# Patient Record
Sex: MALE | Race: WHITE | NOT HISPANIC OR LATINO | Employment: PART TIME | URBAN - NONMETROPOLITAN AREA
[De-identification: names, ages, dates, MRNs, and addresses within clinical notes are randomized per-mention and may not be internally consistent; named-entity substitution may affect disease eponyms.]

---

## 2022-09-22 ENCOUNTER — OFFICE VISIT (OUTPATIENT)
Dept: INTERNAL MEDICINE | Facility: CLINIC | Age: 62
End: 2022-09-22

## 2022-09-22 VITALS
HEART RATE: 70 BPM | HEIGHT: 70 IN | OXYGEN SATURATION: 98 % | DIASTOLIC BLOOD PRESSURE: 72 MMHG | TEMPERATURE: 97.3 F | BODY MASS INDEX: 34.65 KG/M2 | SYSTOLIC BLOOD PRESSURE: 120 MMHG | WEIGHT: 242 LBS

## 2022-09-22 DIAGNOSIS — Z11.59 ENCOUNTER FOR HEPATITIS C SCREENING TEST FOR LOW RISK PATIENT: ICD-10-CM

## 2022-09-22 DIAGNOSIS — R35.1 NOCTURIA: ICD-10-CM

## 2022-09-22 DIAGNOSIS — N30.01 ACUTE CYSTITIS WITH HEMATURIA: Primary | ICD-10-CM

## 2022-09-22 DIAGNOSIS — R35.0 URINARY FREQUENCY: ICD-10-CM

## 2022-09-22 DIAGNOSIS — Z76.89 ENCOUNTER TO ESTABLISH CARE: ICD-10-CM

## 2022-09-22 LAB
BILIRUB BLD-MCNC: NEGATIVE MG/DL
CLARITY, POC: ABNORMAL
COLOR UR: YELLOW
EXPIRATION DATE: ABNORMAL
GLUCOSE UR STRIP-MCNC: NEGATIVE MG/DL
KETONES UR QL: NEGATIVE
LEUKOCYTE EST, POC: ABNORMAL
Lab: ABNORMAL
NITRITE UR-MCNC: POSITIVE MG/ML
PH UR: 6 [PH] (ref 5–8)
PROT UR STRIP-MCNC: ABNORMAL MG/DL
RBC # UR STRIP: ABNORMAL /UL
SP GR UR: 1.03 (ref 1–1.03)
UROBILINOGEN UR QL: NORMAL

## 2022-09-22 PROCEDURE — 81003 URINALYSIS AUTO W/O SCOPE: CPT | Performed by: NURSE PRACTITIONER

## 2022-09-22 PROCEDURE — 99203 OFFICE O/P NEW LOW 30 MIN: CPT | Performed by: NURSE PRACTITIONER

## 2022-09-22 RX ORDER — LOSARTAN POTASSIUM 50 MG/1
50 TABLET ORAL 2 TIMES DAILY
COMMUNITY

## 2022-09-22 RX ORDER — SPIRONOLACTONE 25 MG/1
25 TABLET ORAL 2 TIMES DAILY
COMMUNITY

## 2022-09-22 RX ORDER — SULFAMETHOXAZOLE AND TRIMETHOPRIM 200; 40 MG/5ML; MG/5ML
4 SUSPENSION ORAL 2 TIMES DAILY
Qty: 56 ML | Refills: 0 | Status: SHIPPED | OUTPATIENT
Start: 2022-09-22 | End: 2022-09-29

## 2022-09-22 RX ORDER — LOSARTAN POTASSIUM 50 MG/1
50 TABLET ORAL DAILY
COMMUNITY
End: 2022-09-22 | Stop reason: SDUPTHER

## 2022-09-22 RX ORDER — CARVEDILOL 25 MG/1
25 TABLET ORAL 2 TIMES DAILY WITH MEALS
COMMUNITY

## 2022-09-22 RX ORDER — AMLODIPINE BESYLATE 5 MG/1
5 TABLET ORAL DAILY
COMMUNITY

## 2022-09-22 NOTE — PROGRESS NOTES
"Chief Complaint   Patient presents with   • Establish Care     With Chastity today. Pt states he's had urinary frequency.      Subjective   Amos Azevedo is a 62 y.o. male.     History of Present Illness     Patient presents to establish care.  He is from [place] and is just moved to [place] this February.  History of hypertension.  Takes amlodipine, carvedilol, losartan, spironolactone daily.  These are the only medications he takes on a daily basis.  He was established with cardiologist in [place], but declines a referral to cardiologist today to establish care as his blood pressure has been controlled and he is not having any issues at this time.  He has a history of gastric bypass in 2003.  No history or current use of nicotine/tobacco products.  Acute complaint today is urinary frequency and nocturia.  This has been going on for the past month.  He will have to get up multiple times throughout the night to urinate.  States that he cannot drive for over an hour without having to stop and go to the bathroom.  He denies any dysuria, back pain, suprapubic pain, abdominal pain, nausea, vomiting, fever, aches, chills.  He has not had a UTI before, but does have history of kidney stones.  States that this does not feel like a kidney stone as he does not have any pain like previous episodes.  He has noticed some blood in his urine and a dark color to it.    The following portions of the patient's history were reviewed and updated as appropriate: allergies, current medications, past family history, past medical history, past social history, past surgical history and problem list.    Review of Systems   Endocrine: Positive for polyuria.   Genitourinary: Positive for frequency and urgency.        Nocturia   All other systems reviewed and are negative.      Objective   /72   Pulse 70   Temp 97.3 °F (36.3 °C)   Ht 177.8 cm (70\")   Wt 110 kg (242 lb)   SpO2 98%   BMI 34.72 kg/m²   Body mass index is 34.72 " kg/m².  Physical Exam  Constitutional:       Appearance: Normal appearance.   HENT:      Head: Normocephalic and atraumatic.   Eyes:      Extraocular Movements: Extraocular movements intact.   Cardiovascular:      Rate and Rhythm: Normal rate and regular rhythm.   Pulmonary:      Effort: Pulmonary effort is normal.      Breath sounds: Normal breath sounds.   Abdominal:      General: Abdomen is flat.      Palpations: Abdomen is soft.      Tenderness: There is no abdominal tenderness. There is no right CVA tenderness or left CVA tenderness.   Musculoskeletal:         General: Normal range of motion.      Cervical back: Normal range of motion.   Neurological:      General: No focal deficit present.      Mental Status: He is alert and oriented to person, place, and time.   Psychiatric:         Mood and Affect: Mood normal.         Behavior: Behavior normal.         Thought Content: Thought content normal.         Judgment: Judgment normal.         Labs:  Results for orders placed or performed in visit on 09/22/22   POCT urinalysis dipstick, automated    Specimen: Urine   Result Value Ref Range    Color Yellow Yellow, Straw, Dark Yellow, Yisel    Clarity, UA Cloudy (A) Clear    Specific Gravity  1.030 1.005 - 1.030    pH, Urine 6.0 5.0 - 8.0    Leukocytes Large (3+) (A) Negative    Nitrite, UA Positive (A) Negative    Protein, POC 2+ (A) Negative mg/dL    Glucose, UA Negative Negative mg/dL    Ketones, UA Negative Negative    Urobilinogen, UA Normal Normal, 0.2 E.U./dL    Bilirubin Negative Negative    Blood, UA 3+ (A) Negative    Lot Number [id number]     Expiration Date 12/17/23        Assessment & Plan   Amos Azevedo is here today and the following problems have been addressed:      Diagnoses and all orders for this visit:    1. Acute cystitis with hematuria (Primary)  -     sulfamethoxazole-trimethoprim (BACTRIM,SEPTRA) 200-40 MG/5ML suspension; Take 4 mL by mouth 2 (Two) Times a Day for 7 days.  Dispense: 56 mL;  Refill: 0    2. Encounter to establish care  -     Hemoglobin A1c  -     Comprehensive Metabolic Panel  -     CBC w AUTO Differential  -     Hepatitis C antibody  -     Urine Culture - Urine, Urine, Clean Catch    3. Nocturia  -     Hemoglobin A1c  -     Comprehensive Metabolic Panel  -     CBC w AUTO Differential  -     POCT urinalysis dipstick, automated  -     Urine Culture - Urine, Urine, Clean Catch  -     PSA Diagnostic    4. Urinary frequency  -     Hemoglobin A1c  -     Comprehensive Metabolic Panel  -     CBC w AUTO Differential  -     Urine Culture - Urine, Urine, Clean Catch  -     PSA Diagnostic    5. Encounter for hepatitis C screening test for low risk patient  -     Hepatitis C antibody    Urinalysis today indicative for UTI.  Patient is in no pain, so low suspicion of kidney stone.  Lab panel ordered.  Patient requesting liquid antibiotics, states he cannot swallow pills effectively.  When asking further questions about this, patient states that his always been an issue for him since childhood and nothing new. Prescribed Bactrim suspension to preferred pharmacy, urine culture sent to ensure correct antimicrobial therapy chosen. Encouraged to drink plenty of water, avoid fruit juices (including cranberry juice), and caffeinated beverages, and finish prescribed antibiotic regimen.  Follow-up 2 weeks for urine recheck and annual physical.    I spent 30 minutes caring for Amos Azevedo on this date of service. This time includes time spent by me in the following activities: preparing for the visit, reviewing tests, performing a medically appropriate examination and/or evaluation, counseling and educating the patient/family/caregiver, ordering medications, tests, or procedures and documenting information in the medical record.    Follow Up   Return in about 2 weeks (around 10/6/2022) for Annual.  Patient was given instructions and counseling regarding his condition or for health maintenance advice. Please see  specific information pulled into the AVS if appropriate.     Chastity VERA  [facility] [place]

## 2022-09-23 LAB
ALBUMIN SERPL-MCNC: 4 G/DL (ref 3.5–5.2)
ALBUMIN/GLOB SERPL: 1.7 G/DL
ALP SERPL-CCNC: 64 U/L (ref 39–117)
ALT SERPL-CCNC: 15 U/L (ref 1–41)
AST SERPL-CCNC: 13 U/L (ref 1–40)
BASOPHILS # BLD AUTO: 0.03 10*3/MM3 (ref 0–0.2)
BASOPHILS NFR BLD AUTO: 0.4 % (ref 0–1.5)
BILIRUB SERPL-MCNC: 0.6 MG/DL (ref 0–1.2)
BUN SERPL-MCNC: 20 MG/DL (ref 8–23)
BUN/CREAT SERPL: 13.2 (ref 7–25)
CALCIUM SERPL-MCNC: 8.8 MG/DL (ref 8.6–10.5)
CHLORIDE SERPL-SCNC: 104 MMOL/L (ref 98–107)
CO2 SERPL-SCNC: 24.7 MMOL/L (ref 22–29)
CREAT SERPL-MCNC: 1.52 MG/DL (ref 0.76–1.27)
EGFRCR SERPLBLD CKD-EPI 2021: 50.9 ML/MIN/1.73
EOSINOPHIL # BLD AUTO: 0.17 10*3/MM3 (ref 0–0.4)
EOSINOPHIL NFR BLD AUTO: 2.1 % (ref 0.3–6.2)
ERYTHROCYTE [DISTWIDTH] IN BLOOD BY AUTOMATED COUNT: 13 % (ref 12.3–15.4)
GLOBULIN SER CALC-MCNC: 2.3 GM/DL
GLUCOSE SERPL-MCNC: 96 MG/DL (ref 65–99)
HBA1C MFR BLD: 5.5 % (ref 4.8–5.6)
HCT VFR BLD AUTO: 37 % (ref 37.5–51)
HCV AB S/CO SERPL IA: <0.1 S/CO RATIO (ref 0–0.9)
HGB BLD-MCNC: 12.5 G/DL (ref 13–17.7)
IMM GRANULOCYTES # BLD AUTO: 0.05 10*3/MM3 (ref 0–0.05)
IMM GRANULOCYTES NFR BLD AUTO: 0.6 % (ref 0–0.5)
LYMPHOCYTES # BLD AUTO: 1.12 10*3/MM3 (ref 0.7–3.1)
LYMPHOCYTES NFR BLD AUTO: 13.7 % (ref 19.6–45.3)
MCH RBC QN AUTO: 29.6 PG (ref 26.6–33)
MCHC RBC AUTO-ENTMCNC: 33.8 G/DL (ref 31.5–35.7)
MCV RBC AUTO: 87.7 FL (ref 79–97)
MONOCYTES # BLD AUTO: 0.84 10*3/MM3 (ref 0.1–0.9)
MONOCYTES NFR BLD AUTO: 10.3 % (ref 5–12)
NEUTROPHILS # BLD AUTO: 5.94 10*3/MM3 (ref 1.7–7)
NEUTROPHILS NFR BLD AUTO: 72.9 % (ref 42.7–76)
NRBC BLD AUTO-RTO: 0 /100 WBC (ref 0–0.2)
PLATELET # BLD AUTO: 267 10*3/MM3 (ref 140–450)
POTASSIUM SERPL-SCNC: 5 MMOL/L (ref 3.5–5.2)
PROT SERPL-MCNC: 6.3 G/DL (ref 6–8.5)
PSA SERPL-MCNC: 3.16 NG/ML (ref 0–4)
RBC # BLD AUTO: 4.22 10*6/MM3 (ref 4.14–5.8)
SODIUM SERPL-SCNC: 138 MMOL/L (ref 136–145)
UNABLE TO VOID: NORMAL
WBC # BLD AUTO: 8.15 10*3/MM3 (ref 3.4–10.8)

## 2022-09-26 ENCOUNTER — TELEPHONE (OUTPATIENT)
Dept: INTERNAL MEDICINE | Facility: CLINIC | Age: 62
End: 2022-09-26

## 2022-09-26 LAB
BACTERIA UR CULT: ABNORMAL
BACTERIA UR CULT: ABNORMAL
OTHER ANTIBIOTIC SUSC ISLT: ABNORMAL

## 2022-09-26 NOTE — PROGRESS NOTES
You are on the appropriate antibiotic for UTI, culture positive for E Coli- finish course. F/u next scheduled appointment 10/6

## 2022-09-26 NOTE — PROGRESS NOTES
A1c/PSA normal.  Kidney function is elevated  - need to repeat this next visit, make sure you drink plenty water before drawing next labs.   Mild anemia present.   Will see you on 10/6

## 2022-10-18 ENCOUNTER — OFFICE VISIT (OUTPATIENT)
Dept: INTERNAL MEDICINE | Facility: CLINIC | Age: 62
End: 2022-10-18

## 2022-10-18 VITALS
HEART RATE: 69 BPM | WEIGHT: 253 LBS | TEMPERATURE: 98 F | OXYGEN SATURATION: 99 % | BODY MASS INDEX: 36.22 KG/M2 | SYSTOLIC BLOOD PRESSURE: 134 MMHG | HEIGHT: 70 IN | DIASTOLIC BLOOD PRESSURE: 88 MMHG

## 2022-10-18 DIAGNOSIS — D64.9 ANEMIA, UNSPECIFIED TYPE: ICD-10-CM

## 2022-10-18 DIAGNOSIS — Z23 NEED FOR IMMUNIZATION AGAINST INFLUENZA: ICD-10-CM

## 2022-10-18 DIAGNOSIS — R35.0 URINARY FREQUENCY: ICD-10-CM

## 2022-10-18 DIAGNOSIS — Z13.220 LIPID SCREENING: ICD-10-CM

## 2022-10-18 DIAGNOSIS — Z12.11 COLON CANCER SCREENING: ICD-10-CM

## 2022-10-18 DIAGNOSIS — N39.0 RECURRENT UTI: ICD-10-CM

## 2022-10-18 DIAGNOSIS — R79.9 ABNORMAL BLOOD CHEMISTRY: ICD-10-CM

## 2022-10-18 DIAGNOSIS — E66.9 OBESITY (BMI 30-39.9): ICD-10-CM

## 2022-10-18 DIAGNOSIS — N28.9 ABNORMAL RENAL FUNCTION: ICD-10-CM

## 2022-10-18 DIAGNOSIS — Z00.00 ANNUAL PHYSICAL EXAM: Primary | ICD-10-CM

## 2022-10-18 DIAGNOSIS — R31.0 GROSS HEMATURIA: ICD-10-CM

## 2022-10-18 LAB
BILIRUB BLD-MCNC: NEGATIVE MG/DL
CLARITY, POC: ABNORMAL
COLOR UR: ABNORMAL
EXPIRATION DATE: ABNORMAL
GLUCOSE UR STRIP-MCNC: NEGATIVE MG/DL
KETONES UR QL: NEGATIVE
LEUKOCYTE EST, POC: NEGATIVE
Lab: ABNORMAL
NITRITE UR-MCNC: NEGATIVE MG/ML
PH UR: 6 [PH] (ref 5–8)
PROT UR STRIP-MCNC: ABNORMAL MG/DL
RBC # UR STRIP: ABNORMAL /UL
SP GR UR: 1.03 (ref 1–1.03)
UROBILINOGEN UR QL: NORMAL

## 2022-10-18 PROCEDURE — 90471 IMMUNIZATION ADMIN: CPT | Performed by: NURSE PRACTITIONER

## 2022-10-18 PROCEDURE — 90686 IIV4 VACC NO PRSV 0.5 ML IM: CPT | Performed by: NURSE PRACTITIONER

## 2022-10-18 PROCEDURE — 99396 PREV VISIT EST AGE 40-64: CPT | Performed by: NURSE PRACTITIONER

## 2022-10-18 PROCEDURE — 81003 URINALYSIS AUTO W/O SCOPE: CPT | Performed by: NURSE PRACTITIONER

## 2022-10-18 RX ORDER — CIPROFLOXACIN 500 MG/1
500 TABLET, FILM COATED ORAL 2 TIMES DAILY
Qty: 28 TABLET | Refills: 0 | Status: SHIPPED | OUTPATIENT
Start: 2022-10-18 | End: 2022-11-01

## 2022-10-18 NOTE — PROGRESS NOTES
Subjective   Amos Azevedo is a 62 y.o. male and is here for a comprehensive physical exam. The patient reports continuation of UTI symptoms. Completed Bactrim antibiotic as prescribed. Still having urinary frequency/urgency/nocturia.  A1c and PSA normal.  Declines fever, aches, chills, back pain, suprapubic pain, dysuria.  Reviewed labs with patient, kidney function was abnormal. Recommend repeating labs, discussed possible discontinuation of spironolactone 25 mg - pt wishes to speak to Cardiologist first. He sees Cardiologist in [place]. He is going to return there permanently in December so he does not wish to establish care with Cardiologist here.    Patient is anemic, hx gastric bypass. Will add on iron panel/B12/folate. Has hematuria. Denies black tarry stools or prabhu blood in stools.   He reports having issues with his dentures, is going to see a dentist when he returns to [place], declines referral.      HPI:    Health Habits:  Eye exam within last 2 years? Scheduled 12/10/22  Dental exam every 6 months? No, going to go to dentist in [place] to discuss dentures   Exercise habits: 4 days per week, Kay   Healthy diet? Small frequent meals      The ASCVD Risk score ([place] DK, et al., 2019) failed to calculate for the following reasons:    Cannot find a previous HDL lab    Cannot find a previous total cholesterol lab    Do you take any herbs or supplements that were not prescribed by a doctor? no  Are you taking aspirin daily? No     History:  Date last PSA: 9/22/2022  He reports No decrease in urinary stream,  Some nocturia, No dribbling, No hesitancy.    Family history of prostate cancer: No     reports that he is not currently sexually active.    The following portions of the patient's history were reviewed and updated as appropriate: He  has a past medical history of Hypertension.  He does not have a problem list on file.  He  has a past surgical history that includes Gastric bypass (2003).  His family  "history includes Heart attack in his father; Heart disease in his father.  He  reports that he has never smoked. He has never used smokeless tobacco. He reports that he does not drink alcohol and does not use drugs.  Current Outpatient Medications   Medication Sig Dispense Refill   • amLODIPine (NORVASC) 5 MG tablet Take 5 mg by mouth Daily.     • carvedilol (COREG) 25 MG tablet Take 25 mg by mouth 2 (Two) Times a Day With Meals.     • losartan (COZAAR) 50 MG tablet Take 50 mg by mouth 2 (Two) Times a Day.     • spironolactone (ALDACTONE) 25 MG tablet Take 25 mg by mouth 2 (Two) Times a Day.     • ciprofloxacin (Cipro) 500 MG tablet Take 1 tablet by mouth 2 (Two) Times a Day for 14 days. 28 tablet 0     No current facility-administered medications for this visit.       Review of Systems    Review of Systems   Genitourinary: Positive for nocturia and urgency.   All other systems reviewed and are negative.        Objective   /88   Pulse 69   Temp 98 °F (36.7 °C) (Temporal)   Ht 177.8 cm (70\")   Wt 115 kg (253 lb)   SpO2 99%   BMI 36.30 kg/m²     Physical Exam  Vitals and nursing note reviewed.   Constitutional:       General: He is not in acute distress.     Appearance: Normal appearance. He is obese. He is not diaphoretic.   HENT:      Head: Normocephalic and atraumatic.      Right Ear: Tympanic membrane normal.      Left Ear: Tympanic membrane normal.      Nose: Nose normal.      Mouth/Throat:      Mouth: Mucous membranes are moist.      Pharynx: Oropharynx is clear.   Eyes:      Extraocular Movements: Extraocular movements intact.      Conjunctiva/sclera: Conjunctivae normal.      Pupils: Pupils are equal, round, and reactive to light.   Neck:      Thyroid: No thyromegaly.   Cardiovascular:      Rate and Rhythm: Normal rate and regular rhythm.      Pulses: Normal pulses.      Heart sounds: Normal heart sounds.   Pulmonary:      Effort: Pulmonary effort is normal.      Breath sounds: Normal breath " sounds.   Abdominal:      General: Abdomen is flat. Bowel sounds are normal.      Palpations: Abdomen is soft.      Tenderness: There is no abdominal tenderness. There is no guarding.   Musculoskeletal:         General: Normal range of motion.      Cervical back: Normal range of motion and neck supple.   Lymphadenopathy:      Cervical: No cervical adenopathy.   Skin:     General: Skin is warm and dry.      Coloration: Skin is not jaundiced or pale.      Findings: No rash.   Neurological:      Mental Status: He is alert and oriented to person, place, and time.      Cranial Nerves: No cranial nerve deficit.      Sensory: No sensory deficit.      Motor: No weakness.      Coordination: Coordination normal.      Gait: Gait normal.   Psychiatric:         Mood and Affect: Mood normal.         Behavior: Behavior normal.         Health Maintenance   Topic Date Due   • TDAP/TD VACCINES (1 - Tdap) 10/18/2023 (Originally 6/29/1979)   • INFLUENZA VACCINE  Completed   • ZOSTER VACCINE  Completed        Assessment & Plan   Healthy male exam.     1.    Diagnosis Plan   1. Annual physical exam  Ferritin    CBC and Differential    Iron and TIBC    Renal Function Panel    Lipid Panel      2. Abnormal blood chemistry  Ferritin    CBC and Differential    Iron and TIBC    Renal Function Panel    Vitamin B12    Folate      3. Anemia, unspecified type  Ferritin    CBC and Differential    Iron and TIBC    Vitamin B12    Folate      4. Abnormal renal function  Renal Function Panel      5. Lipid screening  Lipid Panel      6. Urinary frequency  POCT urinalysis dipstick, automated    Ambulatory Referral to Urology    Urine Culture - Urine, Urine, Clean Catch      7. Recurrent UTI  ciprofloxacin (Cipro) 500 MG tablet    Ambulatory Referral to Urology    Urine Culture - Urine, Urine, Clean Catch  Push lots of water. Take antibiotics as directed without missing dose. Referral place to urologist due to gross hematuria. Referrals take 1-2 weeks to  process, then they will call you to schedule appointment.      8. Gross hematuria  Ambulatory Referral to Urology    Urine Culture - Urine, Urine, Clean Catch      9. Colon cancer screening  Cologuard - Stool, Per Rectum      10. Need for immunization against influenza  FluLaval/Fluzone >6 mos ([id number])      11. Obesity (BMI 30-39.9)  Class 2 Severe Obesity (BMI >=35 and <=39.9). Obesity-related health conditions reviewed. Obesity is stable. BMI is is above average; BMI management plan is completed. We discussed portion control and increasing exercise.          2. Patient Counseling:  -Health maintenance information provided and discussed  -Counseled on age-appropriate health screenings  -Immunizations for age discussed, encouraged.  -Encouraged 30 minutes of exercise 5 days a week, or 150 minutes total per week.  -Recommend healthy diet choices and portion control   -Discussed importance of regular dental visits and cleanings every 6 months.  -Discussed importance of regular eye exams    3. Discussed the patient's BMI with him.  The BMI is above average; BMI management plan is completed  4. Return in about 2 weeks urine recheck, with Urologist as scheduled.       Chastity Lama, APRN  10/18/2022  14:02 EDT

## 2022-10-20 LAB
BACTERIA UR CULT: NORMAL
BACTERIA UR CULT: NORMAL

## 2022-10-20 NOTE — PROGRESS NOTES
Repeat urine culture is negative - can actually stop antibiotics as Bactrim cleared the infection, now there is blood still present in urine so I would like for you to see urologist to determine why there is still blood